# Patient Record
Sex: MALE
[De-identification: names, ages, dates, MRNs, and addresses within clinical notes are randomized per-mention and may not be internally consistent; named-entity substitution may affect disease eponyms.]

---

## 2022-08-27 ENCOUNTER — NON-APPOINTMENT (OUTPATIENT)
Age: 27
End: 2022-08-27

## 2024-07-01 PROBLEM — Z00.00 ENCOUNTER FOR PREVENTIVE HEALTH EXAMINATION: Status: ACTIVE | Noted: 2024-07-01

## 2024-07-23 ENCOUNTER — APPOINTMENT (OUTPATIENT)
Dept: ORTHOPEDIC SURGERY | Facility: CLINIC | Age: 29
End: 2024-07-23
Payer: MEDICAID

## 2024-07-23 DIAGNOSIS — S63.641A SPRAIN OF METACARPOPHALANGEAL JOINT OF RIGHT THUMB, INITIAL ENCOUNTER: ICD-10-CM

## 2024-07-23 PROCEDURE — 99203 OFFICE O/P NEW LOW 30 MIN: CPT

## 2024-07-23 PROCEDURE — 73140 X-RAY EXAM OF FINGER(S): CPT | Mod: RT

## 2024-07-23 NOTE — IMAGING
[de-identified] : RIGHT HAND skin intact. mild swelling of thumb MPJ. TTP to ulnar thumb MPJ. wrist ROM: good extension, flexion. good pronation, supination. good EPL, FPL. good finger extension, flex to full fist. good finger abduction and adduction.  SILT to median, ulnar, radial distribution.  palpable radial pulse, brisk cap refill all digits. no triggering.   thumb MPJ stress: - RCL: no pain, no instability. - UCL: + pain at mild flexion, but no instability. no pain at neutral.   XRAYS OF RIGHT THUMB (3 views - PA, OBLIQUE, AND LATERAL VIEWS): no acute displaced fracture or dislocation. punctate calcification volar to distal phalanx base.

## 2024-07-23 NOTE — ASSESSMENT
[FreeTextEntry1] : The condition was explained to the patient.  I explained that sprains / ligament injuries can take 1-2 years for maximal healing. Recommend a course of conservative treatment.  Discussed risk of persistent pain, stiffness, instability, post-traumatic arthritis. Anticipate 1 year for swelling to stabilize, may have permanent enlargement of joint. Patient expressed understanding and is in agreement with treatment plan.   - recommend thumb spica splint PRN painful activity. patient reports he has brace at home. - prescribed OT for R thumb. - pain guided activity modification.  F/u 6 weeks.
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
## 2024-07-25 ENCOUNTER — APPOINTMENT (OUTPATIENT)
Dept: ORTHOPEDIC SURGERY | Facility: CLINIC | Age: 29
End: 2024-07-25

## 2024-08-06 ENCOUNTER — APPOINTMENT (OUTPATIENT)
Dept: ORTHOPEDIC SURGERY | Facility: CLINIC | Age: 29
End: 2024-08-06

## 2024-08-12 ENCOUNTER — APPOINTMENT (OUTPATIENT)
Dept: ORTHOPEDIC SURGERY | Facility: CLINIC | Age: 29
End: 2024-08-12
Payer: MEDICAID

## 2024-08-12 VITALS — HEIGHT: 65 IN | BODY MASS INDEX: 28.82 KG/M2 | WEIGHT: 173 LBS

## 2024-08-12 DIAGNOSIS — G25.89 OTHER SPECIFIED EXTRAPYRAMIDAL AND MOVEMENT DISORDERS: ICD-10-CM

## 2024-08-12 DIAGNOSIS — M75.21 BICIPITAL TENDINITIS, RIGHT SHOULDER: ICD-10-CM

## 2024-08-12 DIAGNOSIS — Z78.9 OTHER SPECIFIED HEALTH STATUS: ICD-10-CM

## 2024-08-12 DIAGNOSIS — F17.200 NICOTINE DEPENDENCE, UNSPECIFIED, UNCOMPLICATED: ICD-10-CM

## 2024-08-12 DIAGNOSIS — M75.101 UNSPECIFIED ROTATOR CUFF TEAR OR RUPTURE OF RIGHT SHOULDER, NOT SPECIFIED AS TRAUMATIC: ICD-10-CM

## 2024-08-12 PROCEDURE — 73030 X-RAY EXAM OF SHOULDER: CPT | Mod: RT

## 2024-08-12 PROCEDURE — 73010 X-RAY EXAM OF SHOULDER BLADE: CPT | Mod: RT

## 2024-08-12 PROCEDURE — 99204 OFFICE O/P NEW MOD 45 MIN: CPT

## 2024-08-12 RX ORDER — MELOXICAM 7.5 MG/1
7.5 TABLET ORAL
Qty: 1 | Refills: 0 | Status: ACTIVE | COMMUNITY
Start: 2024-08-12 | End: 1900-01-01

## 2024-08-12 NOTE — HISTORY OF PRESENT ILLNESS
[de-identified] : Date of Injury/Onset: February 2024 Pain: At Rest: 3 With Activity: 6 Mechanism of injury: denies DWAIN This is NOT a Work Related Injury being treated under Worker's Compensation. This is NOT an athletic injury occurring associated with an interscholastic or organized sports team. Quality of symptoms: stabbing Improves with: ibuprofen  Worse with: lifting, sleeping Prior treatment: muscle relaxants  Prior Imaging: x-rays, MRI Reports Available For Review Today: yes Out of work/sport: not working   08/12/2024 RENÉE 29 year M is here today for evaluation of right shoulder. Patient denies DWAIN. Patient states he had been feeling pain since February 2024. Patient denies n/t however endorses some stabbing pain. Patient notes pain is worse with lifting and sleeping. Patient had been taken ibuprofen and muscle relaxants for pain relief. Patient notes pain is localized. Patient had x-rays and MRI done at [facility] of right shoulder in May and June 2024.

## 2024-08-12 NOTE — IMAGING
[de-identified] : RIGHT SHOULDER  Inspection: No swelling.   Palpation: Tenderness is noted at the bicipital groove, anterior and lateral.   Range of motion: There is pain with range of motion.  , ER 55, @90ER 90, @90IR 30  Strength: There is pain and discomfort with strength testing.  Forward Flexion 4/5. Abduction 4/5.  External Rotation 5-/5 and Internal Rotation 5/5   Neurological testings: motor and sensor intact distally.  Ligament Stability and Special Tests:   There is positive arc of pain.   Shoulder apprehension: neg  Shoulder relocation: neg  Obriens test: pos  Biceps Active test: neg  Galicia Labral Shear: neg  Impingement testing: pos  Naima testing: pos  Whipple: pos  Cross Body Adduction: neg

## 2024-08-12 NOTE — DISCUSSION/SUMMARY
[Medication Risks Reviewed] : Medication risks reviewed [de-identified] : We discussed their diagnosis and treatment options at length. We will first attempt conservative treatment with a course of PT and anti-inflammatory medication. The patient was provided with a prescription to work on scapular strengthening and rotator cuff strengthening on the impingement syndrome protocol. We also discussed the possible of a corticosteroid injection in the future in order to help decrease inflammation and pain so that they can perform better therapy.    Follow up in 4 weeks to re-evaluate progress with therapy  next visit: if no improvement consider inj

## 2024-08-12 NOTE — DATA REVIEWED
[FreeTextEntry1] : 4 v r shoulder neg for fx or dislocation down sloping acromion no ac arthrosis  MR R shoulder [facility] 1.  Intact osseous structures without fracture, stress fracture or avascular necrosis. 2.  Mild superior cuff tendinosis with low-grade interstitial fraying of the supraspinatus fibers at the footprint and low-grade bursal surface fraying. No high-grade or retracted tears. 3.  Mild tenosynovitis of the biceps tendon without tears. 4.  Nondisplaced interstitial tearing of the anterosuperior, anterior labrum and posterosuperior labrum. 5.  Meso os acromiale with edema and reactive changes at the synchondrosis. Intact acromioclavicular joint.

## 2024-09-03 ENCOUNTER — APPOINTMENT (OUTPATIENT)
Dept: ORTHOPEDIC SURGERY | Facility: CLINIC | Age: 29
End: 2024-09-03
Payer: MEDICAID

## 2024-09-03 DIAGNOSIS — S63.641A SPRAIN OF METACARPOPHALANGEAL JOINT OF RIGHT THUMB, INITIAL ENCOUNTER: ICD-10-CM

## 2024-09-03 PROCEDURE — 99213 OFFICE O/P EST LOW 20 MIN: CPT

## 2024-09-03 NOTE — IMAGING
[de-identified] : RIGHT HAND skin intact. mild swelling of thumb MPJ. no TTP. wrist ROM: good extension, flexion. good pronation, supination. good EPL, FPL. thumb opposition to base of SF. good finger extension, flex to full fist. good finger abduction and adduction.  SILT to median, ulnar, radial distribution.  palpable radial pulse, brisk cap refill all digits. no triggering.  thumb MPJ stress: - RCL: no pain, no instability. - UCL: no pain, no instability.

## 2024-09-03 NOTE — ASSESSMENT
[FreeTextEntry1] : - prescribed OT custom hand-based thumb spica splint, PRN pain. - again prescribed OT for R thumb. - advance activity as tolerated.  F/u PRN.
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
## 2024-09-12 ENCOUNTER — APPOINTMENT (OUTPATIENT)
Dept: ORTHOPEDIC SURGERY | Facility: CLINIC | Age: 29
End: 2024-09-12

## 2025-09-02 ENCOUNTER — RX RENEWAL (OUTPATIENT)
Age: 30
End: 2025-09-02